# Patient Record
Sex: FEMALE | Race: WHITE | Employment: FULL TIME | URBAN - METROPOLITAN AREA
[De-identification: names, ages, dates, MRNs, and addresses within clinical notes are randomized per-mention and may not be internally consistent; named-entity substitution may affect disease eponyms.]

---

## 2017-09-15 ENCOUNTER — OFFICE VISIT (OUTPATIENT)
Dept: FAMILY MEDICINE CLINIC | Facility: CLINIC | Age: 22
End: 2017-09-15

## 2017-09-15 VITALS
SYSTOLIC BLOOD PRESSURE: 110 MMHG | WEIGHT: 135 LBS | BODY MASS INDEX: 23.92 KG/M2 | TEMPERATURE: 98 F | OXYGEN SATURATION: 98 % | RESPIRATION RATE: 14 BRPM | HEIGHT: 63 IN | HEART RATE: 101 BPM | DIASTOLIC BLOOD PRESSURE: 60 MMHG

## 2017-09-15 DIAGNOSIS — R30.0 DYSURIA: Primary | ICD-10-CM

## 2017-09-15 DIAGNOSIS — N39.0 URINARY TRACT INFECTION WITH HEMATURIA, SITE UNSPECIFIED: ICD-10-CM

## 2017-09-15 DIAGNOSIS — R31.9 URINARY TRACT INFECTION WITH HEMATURIA, SITE UNSPECIFIED: ICD-10-CM

## 2017-09-15 LAB
BILIRUBIN: NEGATIVE
GLUCOSE (URINE DIPSTICK): NEGATIVE MG/DL
KETONES (URINE DIPSTICK): NEGATIVE MG/DL
MULTISTIX LOT#: ABNORMAL NUMERIC
NITRITE, URINE: POSITIVE
PH, URINE: 7.5 (ref 4.5–8)
PROTEIN (URINE DIPSTICK): 30 MG/DL
SPECIFIC GRAVITY: 1.01 (ref 1–1.03)
UROBILINOGEN,SEMI-QN: 0.2 MG/DL (ref 0–1.9)

## 2017-09-15 PROCEDURE — 87086 URINE CULTURE/COLONY COUNT: CPT | Performed by: PHYSICIAN ASSISTANT

## 2017-09-15 PROCEDURE — 87077 CULTURE AEROBIC IDENTIFY: CPT | Performed by: PHYSICIAN ASSISTANT

## 2017-09-15 PROCEDURE — 99202 OFFICE O/P NEW SF 15 MIN: CPT | Performed by: PHYSICIAN ASSISTANT

## 2017-09-15 PROCEDURE — 81003 URINALYSIS AUTO W/O SCOPE: CPT | Performed by: PHYSICIAN ASSISTANT

## 2017-09-15 PROCEDURE — 87186 SC STD MICRODIL/AGAR DIL: CPT | Performed by: PHYSICIAN ASSISTANT

## 2017-09-15 RX ORDER — NITROFURANTOIN 25; 75 MG/1; MG/1
100 CAPSULE ORAL 2 TIMES DAILY
Qty: 14 CAPSULE | Refills: 0 | Status: SHIPPED | OUTPATIENT
Start: 2017-09-15 | End: 2017-09-22

## 2017-09-15 NOTE — PATIENT INSTRUCTIONS
We will send out a urine culture and contact you with the results in 2-3 days   · Complete full course of antibiotics. · Over the counter Tylenol/Motrin as needed for pain/discomfort. · Follow up in 2-3 days if symptoms do not improve or worsen.     · Ret

## 2017-09-15 NOTE — PROGRESS NOTES
CHIEF COMPLAINT:   Patient presents with:  UTI: burning, freq, urgency less than 12 hours       HPI:   Starr Celaya is a 25year old female who presents with symptoms of UTI. Complaining of urinary frequency, urgency, dysuria since 3 am last night.  Sy wheezing or rhonchi  GI: BS present x 4.   No hepatosplenomegaly, no tenderness  : No suprapubic tenderness, bladder distention, or CVAT   EXTREMITIES: no edema  LYMPH: no lymphadenopathy    Recent Results (from the past 24 hour(s))  -URINALYSIS, AUTO, W/ · Return to clinic or see your primary care provider immediately if you experience nausea, vomiting, or fever. What can you do to help prevent bladder infections? Urinate often during the day. You should also urinate after you have sex.    If you are

## 2017-11-27 ENCOUNTER — LAB ENCOUNTER (OUTPATIENT)
Dept: LAB | Age: 22
End: 2017-11-27
Attending: FAMILY MEDICINE
Payer: COMMERCIAL

## 2017-11-27 ENCOUNTER — OFFICE VISIT (OUTPATIENT)
Dept: FAMILY MEDICINE CLINIC | Facility: CLINIC | Age: 22
End: 2017-11-27

## 2017-11-27 VITALS
HEIGHT: 63 IN | HEART RATE: 70 BPM | SYSTOLIC BLOOD PRESSURE: 115 MMHG | DIASTOLIC BLOOD PRESSURE: 72 MMHG | WEIGHT: 148.81 LBS | BODY MASS INDEX: 26.37 KG/M2

## 2017-11-27 DIAGNOSIS — M62.838 TRAPEZIUS MUSCLE SPASM: ICD-10-CM

## 2017-11-27 DIAGNOSIS — N39.0 POSTCOITAL UTI: ICD-10-CM

## 2017-11-27 DIAGNOSIS — L98.9 SKIN LESION: ICD-10-CM

## 2017-11-27 DIAGNOSIS — Z00.00 ROUTINE MEDICAL EXAM: Primary | ICD-10-CM

## 2017-11-27 DIAGNOSIS — Z00.00 ROUTINE MEDICAL EXAM: ICD-10-CM

## 2017-11-27 DIAGNOSIS — E55.9 VITAMIN D DEFICIENCY: ICD-10-CM

## 2017-11-27 PROCEDURE — 99212 OFFICE O/P EST SF 10 MIN: CPT | Performed by: FAMILY MEDICINE

## 2017-11-27 PROCEDURE — 85025 COMPLETE CBC W/AUTO DIFF WBC: CPT

## 2017-11-27 PROCEDURE — 80053 COMPREHEN METABOLIC PANEL: CPT

## 2017-11-27 PROCEDURE — 82306 VITAMIN D 25 HYDROXY: CPT

## 2017-11-27 PROCEDURE — 99213 OFFICE O/P EST LOW 20 MIN: CPT | Performed by: FAMILY MEDICINE

## 2017-11-27 PROCEDURE — 99395 PREV VISIT EST AGE 18-39: CPT | Performed by: FAMILY MEDICINE

## 2017-11-27 PROCEDURE — 84443 ASSAY THYROID STIM HORMONE: CPT

## 2017-11-27 PROCEDURE — 36415 COLL VENOUS BLD VENIPUNCTURE: CPT

## 2017-11-27 PROCEDURE — 82607 VITAMIN B-12: CPT

## 2017-11-27 RX ORDER — IMIQUIMOD 12.5 MG/.25G
1 CREAM TOPICAL
Qty: 12 EACH | Refills: 2 | Status: SHIPPED | OUTPATIENT
Start: 2017-11-27

## 2017-11-27 NOTE — PROGRESS NOTES
HPI:   Carlos Colmenares is a 25year old female who presents for a complete physical exam.    Reports last time had vitamin D levels - taking 2000 units every other day in winter months. Reports more frequent UTIs in past few years.  More so after intercours easy bruising  ENDOCRINE: denies weight changes  ALL/ASTHMA: denies hx of allergy or asthma    EXAM:   /72 (BP Location: Left arm)   Pulse 70   Ht 5' 3\" (1.6 m)   Wt 148 lb 12.8 oz (67.5 kg)   LMP 11/25/2017 (Exact Date)   BMI 26.36 kg/m²     GENERA

## 2017-12-07 NOTE — PROGRESS NOTES
Tests are all normal except for an elevated white blood cells. Did you have a cold or other symptoms around the time of the labs?  If not, recommend repeating in few weeks - Dr Ravi Matias

## 2021-09-17 ENCOUNTER — LAB REQUISITION (OUTPATIENT)
Dept: LAB | Age: 26
End: 2021-09-17

## 2021-09-17 DIAGNOSIS — N30.01 ACUTE CYSTITIS WITH HEMATURIA: ICD-10-CM

## 2021-09-17 PROCEDURE — 87086 URINE CULTURE/COLONY COUNT: CPT | Performed by: CLINICAL MEDICAL LABORATORY

## 2021-09-17 PROCEDURE — PSEU9005 URINE, BACTERIAL CULTURE: Performed by: CLINICAL MEDICAL LABORATORY

## 2021-09-19 LAB — BACTERIA UR CULT: NORMAL
